# Patient Record
Sex: FEMALE | Race: BLACK OR AFRICAN AMERICAN | Employment: OTHER | URBAN - METROPOLITAN AREA
[De-identification: names, ages, dates, MRNs, and addresses within clinical notes are randomized per-mention and may not be internally consistent; named-entity substitution may affect disease eponyms.]

---

## 2017-05-24 ENCOUNTER — OFFICE VISIT (OUTPATIENT)
Dept: ORTHOPEDIC SURGERY | Age: 82
End: 2017-05-24

## 2017-05-24 VITALS — BODY MASS INDEX: 27.6 KG/M2 | RESPIRATION RATE: 16 BRPM | HEIGHT: 62 IN | WEIGHT: 150 LBS

## 2017-05-24 DIAGNOSIS — S72.144D CLOSED NONDISPLACED INTERTROCHANTERIC FRACTURE OF RIGHT FEMUR WITH ROUTINE HEALING: Primary | ICD-10-CM

## 2017-05-24 PROCEDURE — 4040F PNEUMOC VAC/ADMIN/RCVD: CPT | Performed by: ORTHOPAEDIC SURGERY

## 2017-05-24 PROCEDURE — 1090F PRES/ABSN URINE INCON ASSESS: CPT | Performed by: ORTHOPAEDIC SURGERY

## 2017-05-24 PROCEDURE — G8427 DOCREV CUR MEDS BY ELIG CLIN: HCPCS | Performed by: ORTHOPAEDIC SURGERY

## 2017-05-24 PROCEDURE — 99024 POSTOP FOLLOW-UP VISIT: CPT | Performed by: ORTHOPAEDIC SURGERY

## 2017-05-24 PROCEDURE — 1036F TOBACCO NON-USER: CPT | Performed by: ORTHOPAEDIC SURGERY

## 2017-05-24 PROCEDURE — 73502 X-RAY EXAM HIP UNI 2-3 VIEWS: CPT | Performed by: ORTHOPAEDIC SURGERY

## 2017-05-24 PROCEDURE — G8420 CALC BMI NORM PARAMETERS: HCPCS | Performed by: ORTHOPAEDIC SURGERY

## 2017-05-24 PROCEDURE — 1123F ACP DISCUSS/DSCN MKR DOCD: CPT | Performed by: ORTHOPAEDIC SURGERY

## 2017-05-24 RX ORDER — TRAMADOL HYDROCHLORIDE 50 MG/1
50 TABLET ORAL EVERY 6 HOURS PRN
Status: ON HOLD | COMMUNITY
End: 2020-12-24 | Stop reason: HOSPADM

## 2017-05-24 RX ORDER — NYSTATIN 100000 [USP'U]/G
POWDER TOPICAL 4 TIMES DAILY
Status: ON HOLD | COMMUNITY
End: 2020-12-23

## 2017-05-24 RX ORDER — IBUPROFEN 200 MG
200 TABLET ORAL EVERY 6 HOURS PRN
Status: ON HOLD | COMMUNITY
End: 2020-12-23

## 2017-07-26 ENCOUNTER — OFFICE VISIT (OUTPATIENT)
Dept: ORTHOPEDIC SURGERY | Age: 82
End: 2017-07-26

## 2017-07-26 VITALS — BODY MASS INDEX: 27.6 KG/M2 | WEIGHT: 150 LBS | RESPIRATION RATE: 16 BRPM | HEIGHT: 62 IN

## 2017-07-26 DIAGNOSIS — S72.144D CLOSED NONDISPLACED INTERTROCHANTERIC FRACTURE OF RIGHT FEMUR WITH ROUTINE HEALING: Primary | ICD-10-CM

## 2017-07-26 PROCEDURE — G8419 CALC BMI OUT NRM PARAM NOF/U: HCPCS | Performed by: ORTHOPAEDIC SURGERY

## 2017-07-26 PROCEDURE — 99213 OFFICE O/P EST LOW 20 MIN: CPT | Performed by: ORTHOPAEDIC SURGERY

## 2017-07-26 PROCEDURE — 4040F PNEUMOC VAC/ADMIN/RCVD: CPT | Performed by: ORTHOPAEDIC SURGERY

## 2017-07-26 PROCEDURE — 1090F PRES/ABSN URINE INCON ASSESS: CPT | Performed by: ORTHOPAEDIC SURGERY

## 2017-07-26 PROCEDURE — 1123F ACP DISCUSS/DSCN MKR DOCD: CPT | Performed by: ORTHOPAEDIC SURGERY

## 2017-07-26 PROCEDURE — G8428 CUR MEDS NOT DOCUMENT: HCPCS | Performed by: ORTHOPAEDIC SURGERY

## 2017-07-26 PROCEDURE — 1036F TOBACCO NON-USER: CPT | Performed by: ORTHOPAEDIC SURGERY

## 2017-09-14 ENCOUNTER — HOSPITAL ENCOUNTER (OUTPATIENT)
Dept: MAMMOGRAPHY | Age: 82
Discharge: HOME | End: 2017-09-14
Attending: INTERNAL MEDICINE | Admitting: INTERNAL MEDICINE

## 2017-09-14 DIAGNOSIS — D49.3 NEOPLASM OF BREAST: ICD-10-CM

## 2017-09-14 DIAGNOSIS — Z12.31 ENCOUNTER FOR SCREENING MAMMOGRAM FOR BREAST CANCER: ICD-10-CM

## 2018-10-17 ENCOUNTER — HOSPITAL ENCOUNTER (OUTPATIENT)
Dept: WOMENS IMAGING | Age: 83
Discharge: HOME | End: 2018-10-17
Payer: MEDICARE

## 2018-10-17 DIAGNOSIS — Z12.39 BREAST CANCER SCREENING: ICD-10-CM

## 2018-10-17 PROCEDURE — 77067 SCR MAMMO BI INCL CAD: CPT

## 2019-10-18 ENCOUNTER — HOSPITAL ENCOUNTER (OUTPATIENT)
Dept: WOMENS IMAGING | Age: 84
Discharge: HOME | End: 2019-10-18
Payer: MEDICARE

## 2019-10-18 DIAGNOSIS — Z12.31 ENCOUNTER FOR SCREENING MAMMOGRAM FOR BREAST CANCER: ICD-10-CM

## 2019-10-18 PROCEDURE — 77067 SCR MAMMO BI INCL CAD: CPT

## 2020-11-02 ENCOUNTER — HOSPITAL ENCOUNTER (OUTPATIENT)
Dept: WOMENS IMAGING | Age: 85
Discharge: HOME | End: 2020-11-02
Payer: MEDICARE

## 2020-11-02 PROCEDURE — 77067 SCR MAMMO BI INCL CAD: CPT

## 2020-12-22 ENCOUNTER — APPOINTMENT (OUTPATIENT)
Dept: GENERAL RADIOLOGY | Age: 85
DRG: 177 | End: 2020-12-22
Payer: MEDICARE

## 2020-12-22 ENCOUNTER — HOSPITAL ENCOUNTER (INPATIENT)
Age: 85
LOS: 2 days | Discharge: HOME HEALTH | DRG: 177 | End: 2020-12-25
Attending: INTERNAL MEDICINE | Admitting: INTERNAL MEDICINE
Payer: MEDICARE

## 2020-12-22 DIAGNOSIS — R41.82 ALTERED MENTAL STATUS, UNSPECIFIED ALTERED MENTAL STATUS TYPE: ICD-10-CM

## 2020-12-22 DIAGNOSIS — S32.050A COMPRESSION FRACTURE OF L5 VERTEBRA, INITIAL ENCOUNTER (HCC): ICD-10-CM

## 2020-12-22 DIAGNOSIS — S32.050D COMPRESSION FRACTURE OF L5 VERTEBRA WITH ROUTINE HEALING, SUBSEQUENT ENCOUNTER: ICD-10-CM

## 2020-12-22 DIAGNOSIS — J45.41 MODERATE PERSISTENT REACTIVE AIRWAY DISEASE WITH ACUTE EXACERBATION: ICD-10-CM

## 2020-12-22 DIAGNOSIS — N39.0 URINARY TRACT INFECTION IN FEMALE: Primary | ICD-10-CM

## 2020-12-22 PROCEDURE — 93005 ELECTROCARDIOGRAM TRACING: CPT | Performed by: NURSE PRACTITIONER

## 2020-12-22 PROCEDURE — 96374 THER/PROPH/DIAG INJ IV PUSH: CPT

## 2020-12-22 PROCEDURE — 99284 EMERGENCY DEPT VISIT MOD MDM: CPT

## 2020-12-22 PROCEDURE — 71045 X-RAY EXAM CHEST 1 VIEW: CPT

## 2020-12-22 ASSESSMENT — ENCOUNTER SYMPTOMS
SHORTNESS OF BREATH: 0
DIARRHEA: 0
NAUSEA: 0
VOMITING: 0
ABDOMINAL PAIN: 0
CHEST TIGHTNESS: 0

## 2020-12-23 ENCOUNTER — APPOINTMENT (OUTPATIENT)
Dept: CT IMAGING | Age: 85
DRG: 177 | End: 2020-12-23
Payer: MEDICARE

## 2020-12-23 PROBLEM — N39.0 UTI (URINARY TRACT INFECTION): Status: ACTIVE | Noted: 2020-12-23

## 2020-12-23 LAB
A/G RATIO: 0.7 (ref 1.1–2.2)
ALBUMIN SERPL-MCNC: 3.5 G/DL (ref 3.4–5)
ALP BLD-CCNC: 77 U/L (ref 40–129)
ALT SERPL-CCNC: 16 U/L (ref 10–40)
ANION GAP SERPL CALCULATED.3IONS-SCNC: 18 MMOL/L (ref 3–16)
AST SERPL-CCNC: 32 U/L (ref 15–37)
BACTERIA: ABNORMAL /HPF
BASOPHILS ABSOLUTE: 0 K/UL (ref 0–0.2)
BASOPHILS RELATIVE PERCENT: 0.4 %
BILIRUB SERPL-MCNC: 1.5 MG/DL (ref 0–1)
BILIRUBIN URINE: ABNORMAL
BLOOD, URINE: ABNORMAL
BUN BLDV-MCNC: 33 MG/DL (ref 7–20)
CALCIUM SERPL-MCNC: 10 MG/DL (ref 8.3–10.6)
CHLORIDE BLD-SCNC: 99 MMOL/L (ref 99–110)
CLARITY: ABNORMAL
CO2: 25 MMOL/L (ref 21–32)
COLOR: ABNORMAL
COMMENT UA: ABNORMAL
CREAT SERPL-MCNC: 0.8 MG/DL (ref 0.6–1.2)
D DIMER: 1468 NG/ML DDU (ref 0–229)
EKG ATRIAL RATE: 117 BPM
EKG DIAGNOSIS: NORMAL
EKG P AXIS: 9 DEGREES
EKG P-R INTERVAL: 166 MS
EKG Q-T INTERVAL: 326 MS
EKG QRS DURATION: 92 MS
EKG QTC CALCULATION (BAZETT): 454 MS
EKG R AXIS: -40 DEGREES
EKG T AXIS: 100 DEGREES
EKG VENTRICULAR RATE: 117 BPM
EOSINOPHILS ABSOLUTE: 0.1 K/UL (ref 0–0.6)
EOSINOPHILS RELATIVE PERCENT: 0.7 %
EPITHELIAL CELLS, UA: 3 /HPF (ref 0–5)
GFR AFRICAN AMERICAN: >60
GFR NON-AFRICAN AMERICAN: >60
GLOBULIN: 4.7 G/DL
GLUCOSE BLD-MCNC: 122 MG/DL (ref 70–99)
GLUCOSE URINE: NEGATIVE MG/DL
HCT VFR BLD CALC: 46.1 % (ref 36–48)
HEMOGLOBIN: 14.5 G/DL (ref 12–16)
INR BLD: 1.29 (ref 0.86–1.14)
KETONES, URINE: 15 MG/DL
LEUKOCYTE ESTERASE, URINE: ABNORMAL
LYMPHOCYTES ABSOLUTE: 2.3 K/UL (ref 1–5.1)
LYMPHOCYTES RELATIVE PERCENT: 26.5 %
MCH RBC QN AUTO: 26.5 PG (ref 26–34)
MCHC RBC AUTO-ENTMCNC: 31.5 G/DL (ref 31–36)
MCV RBC AUTO: 84.2 FL (ref 80–100)
MICROSCOPIC EXAMINATION: YES
MONOCYTES ABSOLUTE: 0.9 K/UL (ref 0–1.3)
MONOCYTES RELATIVE PERCENT: 10 %
NEUTROPHILS ABSOLUTE: 5.4 K/UL (ref 1.7–7.7)
NEUTROPHILS RELATIVE PERCENT: 62.4 %
NITRITE, URINE: NEGATIVE
PDW BLD-RTO: 14.4 % (ref 12.4–15.4)
PH UA: 5.5 (ref 5–8)
PLATELET # BLD: 292 K/UL (ref 135–450)
PMV BLD AUTO: 9.2 FL (ref 5–10.5)
POTASSIUM SERPL-SCNC: 4.4 MMOL/L (ref 3.5–5.1)
PRO-BNP: 555 PG/ML (ref 0–449)
PROCALCITONIN: 0.12 NG/ML (ref 0–0.15)
PROTEIN UA: 100 MG/DL
PROTHROMBIN TIME: 15 SEC (ref 10–13.2)
RBC # BLD: 5.48 M/UL (ref 4–5.2)
RBC UA: ABNORMAL /HPF (ref 0–4)
SODIUM BLD-SCNC: 142 MMOL/L (ref 136–145)
SPECIFIC GRAVITY UA: 1.02 (ref 1–1.03)
TOTAL PROTEIN: 8.2 G/DL (ref 6.4–8.2)
TROPONIN: 0.05 NG/ML
URINE REFLEX TO CULTURE: YES
URINE TYPE: ABNORMAL
UROBILINOGEN, URINE: 2 E.U./DL
WBC # BLD: 8.7 K/UL (ref 4–11)
WBC UA: >900 /HPF (ref 0–5)

## 2020-12-23 PROCEDURE — 94640 AIRWAY INHALATION TREATMENT: CPT

## 2020-12-23 PROCEDURE — 94761 N-INVAS EAR/PLS OXIMETRY MLT: CPT

## 2020-12-23 PROCEDURE — U0003 INFECTIOUS AGENT DETECTION BY NUCLEIC ACID (DNA OR RNA); SEVERE ACUTE RESPIRATORY SYNDROME CORONAVIRUS 2 (SARS-COV-2) (CORONAVIRUS DISEASE [COVID-19]), AMPLIFIED PROBE TECHNIQUE, MAKING USE OF HIGH THROUGHPUT TECHNOLOGIES AS DESCRIBED BY CMS-2020-01-R: HCPCS

## 2020-12-23 PROCEDURE — 6360000002 HC RX W HCPCS: Performed by: INTERNAL MEDICINE

## 2020-12-23 PROCEDURE — 84145 PROCALCITONIN (PCT): CPT

## 2020-12-23 PROCEDURE — 85610 PROTHROMBIN TIME: CPT

## 2020-12-23 PROCEDURE — 93010 ELECTROCARDIOGRAM REPORT: CPT | Performed by: INTERNAL MEDICINE

## 2020-12-23 PROCEDURE — U0002 COVID-19 LAB TEST NON-CDC: HCPCS

## 2020-12-23 PROCEDURE — 80053 COMPREHEN METABOLIC PANEL: CPT

## 2020-12-23 PROCEDURE — 83880 ASSAY OF NATRIURETIC PEPTIDE: CPT

## 2020-12-23 PROCEDURE — 81001 URINALYSIS AUTO W/SCOPE: CPT

## 2020-12-23 PROCEDURE — 87186 SC STD MICRODIL/AGAR DIL: CPT

## 2020-12-23 PROCEDURE — 1200000000 HC SEMI PRIVATE

## 2020-12-23 PROCEDURE — 87077 CULTURE AEROBIC IDENTIFY: CPT

## 2020-12-23 PROCEDURE — 85379 FIBRIN DEGRADATION QUANT: CPT

## 2020-12-23 PROCEDURE — 6370000000 HC RX 637 (ALT 250 FOR IP): Performed by: INTERNAL MEDICINE

## 2020-12-23 PROCEDURE — 87086 URINE CULTURE/COLONY COUNT: CPT

## 2020-12-23 PROCEDURE — 2700000000 HC OXYGEN THERAPY PER DAY

## 2020-12-23 PROCEDURE — 6360000002 HC RX W HCPCS: Performed by: NURSE PRACTITIONER

## 2020-12-23 PROCEDURE — 84484 ASSAY OF TROPONIN QUANT: CPT

## 2020-12-23 PROCEDURE — 85025 COMPLETE CBC W/AUTO DIFF WBC: CPT

## 2020-12-23 PROCEDURE — 70450 CT HEAD/BRAIN W/O DYE: CPT

## 2020-12-23 RX ORDER — SODIUM CHLORIDE 9 MG/ML
INJECTION, SOLUTION INTRAVENOUS CONTINUOUS
Status: DISCONTINUED | OUTPATIENT
Start: 2020-12-23 | End: 2020-12-23

## 2020-12-23 RX ORDER — FERROUS SULFATE 325(65) MG
325 TABLET ORAL
Status: DISCONTINUED | OUTPATIENT
Start: 2020-12-23 | End: 2020-12-25 | Stop reason: HOSPADM

## 2020-12-23 RX ORDER — ARFORMOTEROL TARTRATE 15 UG/2ML
15 SOLUTION RESPIRATORY (INHALATION) 2 TIMES DAILY
Status: DISCONTINUED | OUTPATIENT
Start: 2020-12-23 | End: 2020-12-25 | Stop reason: HOSPADM

## 2020-12-23 RX ORDER — M-VIT,TX,IRON,MINS/CALC/FOLIC 27MG-0.4MG
1 TABLET ORAL DAILY
Status: DISCONTINUED | OUTPATIENT
Start: 2020-12-23 | End: 2020-12-25 | Stop reason: HOSPADM

## 2020-12-23 RX ORDER — IPRATROPIUM BROMIDE AND ALBUTEROL SULFATE 2.5; .5 MG/3ML; MG/3ML
3 SOLUTION RESPIRATORY (INHALATION)
Status: DISCONTINUED | OUTPATIENT
Start: 2020-12-23 | End: 2020-12-23 | Stop reason: ALTCHOICE

## 2020-12-23 RX ORDER — AMLODIPINE BESYLATE 5 MG/1
5 TABLET ORAL NIGHTLY
Status: DISCONTINUED | OUTPATIENT
Start: 2020-12-23 | End: 2020-12-25 | Stop reason: HOSPADM

## 2020-12-23 RX ORDER — SENNA PLUS 8.6 MG/1
1 TABLET ORAL DAILY
Status: DISCONTINUED | OUTPATIENT
Start: 2020-12-23 | End: 2020-12-25 | Stop reason: HOSPADM

## 2020-12-23 RX ORDER — LABETALOL HYDROCHLORIDE 5 MG/ML
20 INJECTION, SOLUTION INTRAVENOUS EVERY 4 HOURS PRN
Status: DISCONTINUED | OUTPATIENT
Start: 2020-12-23 | End: 2020-12-25 | Stop reason: HOSPADM

## 2020-12-23 RX ORDER — CETIRIZINE HYDROCHLORIDE 10 MG/1
10 TABLET ORAL DAILY
Status: DISCONTINUED | OUTPATIENT
Start: 2020-12-23 | End: 2020-12-25 | Stop reason: HOSPADM

## 2020-12-23 RX ORDER — TRAMADOL HYDROCHLORIDE 50 MG/1
50 TABLET ORAL EVERY 6 HOURS PRN
Status: DISCONTINUED | OUTPATIENT
Start: 2020-12-23 | End: 2020-12-25 | Stop reason: HOSPADM

## 2020-12-23 RX ORDER — PREDNISONE 1 MG/1
5 TABLET ORAL DAILY
Status: DISCONTINUED | OUTPATIENT
Start: 2020-12-23 | End: 2020-12-25 | Stop reason: HOSPADM

## 2020-12-23 RX ORDER — GUAIFENESIN 100 MG/5ML
200 SOLUTION ORAL EVERY 6 HOURS PRN
Status: DISCONTINUED | OUTPATIENT
Start: 2020-12-23 | End: 2020-12-25 | Stop reason: HOSPADM

## 2020-12-23 RX ORDER — FUROSEMIDE 10 MG/ML
20 INJECTION INTRAMUSCULAR; INTRAVENOUS DAILY
Status: DISCONTINUED | OUTPATIENT
Start: 2020-12-23 | End: 2020-12-25 | Stop reason: HOSPADM

## 2020-12-23 RX ORDER — FUROSEMIDE 40 MG/1
40 TABLET ORAL DAILY
Status: DISCONTINUED | OUTPATIENT
Start: 2020-12-23 | End: 2020-12-23

## 2020-12-23 RX ORDER — DIAPER,BRIEF,INFANT-TODD,DISP
1 EACH MISCELLANEOUS 2 TIMES DAILY PRN
Status: DISCONTINUED | OUTPATIENT
Start: 2020-12-23 | End: 2020-12-23 | Stop reason: RX

## 2020-12-23 RX ORDER — HYDROCODONE BITARTRATE AND ACETAMINOPHEN 5; 325 MG/1; MG/1
1 TABLET ORAL EVERY 4 HOURS PRN
Status: DISCONTINUED | OUTPATIENT
Start: 2020-12-23 | End: 2020-12-25 | Stop reason: HOSPADM

## 2020-12-23 RX ORDER — CHOLECALCIFEROL (VITAMIN D3) 1250 MCG
1 CAPSULE ORAL
Status: DISCONTINUED | OUTPATIENT
Start: 2020-12-23 | End: 2020-12-23 | Stop reason: RX

## 2020-12-23 RX ORDER — IBUPROFEN 400 MG/1
200 TABLET ORAL EVERY 6 HOURS PRN
Status: DISCONTINUED | OUTPATIENT
Start: 2020-12-23 | End: 2020-12-23

## 2020-12-23 RX ORDER — POLYETHYLENE GLYCOL 3350 17 G/17G
17 POWDER, FOR SOLUTION ORAL DAILY PRN
Status: DISCONTINUED | OUTPATIENT
Start: 2020-12-23 | End: 2020-12-25 | Stop reason: HOSPADM

## 2020-12-23 RX ORDER — ACETAMINOPHEN 325 MG/1
650 TABLET ORAL EVERY 4 HOURS PRN
Status: DISCONTINUED | OUTPATIENT
Start: 2020-12-23 | End: 2020-12-25 | Stop reason: HOSPADM

## 2020-12-23 RX ORDER — DIAPER,BRIEF,INFANT-TODD,DISP
1 EACH MISCELLANEOUS NIGHTLY
Status: DISCONTINUED | OUTPATIENT
Start: 2020-12-23 | End: 2020-12-23 | Stop reason: RX

## 2020-12-23 RX ORDER — MONTELUKAST SODIUM 10 MG/1
10 TABLET ORAL NIGHTLY
Status: DISCONTINUED | OUTPATIENT
Start: 2020-12-23 | End: 2020-12-25 | Stop reason: HOSPADM

## 2020-12-23 RX ORDER — PROMETHAZINE HYDROCHLORIDE AND CODEINE PHOSPHATE 6.25; 1 MG/5ML; MG/5ML
5 SYRUP ORAL DAILY PRN
Status: DISCONTINUED | OUTPATIENT
Start: 2020-12-23 | End: 2020-12-25 | Stop reason: HOSPADM

## 2020-12-23 RX ORDER — ALBUTEROL SULFATE 90 UG/1
2 AEROSOL, METERED RESPIRATORY (INHALATION)
Status: DISCONTINUED | OUTPATIENT
Start: 2020-12-23 | End: 2020-12-25 | Stop reason: HOSPADM

## 2020-12-23 RX ORDER — ESCITALOPRAM OXALATE 10 MG/1
10 TABLET ORAL DAILY
Status: DISCONTINUED | OUTPATIENT
Start: 2020-12-23 | End: 2020-12-25 | Stop reason: HOSPADM

## 2020-12-23 RX ORDER — BISACODYL 10 MG
10 SUPPOSITORY, RECTAL RECTAL DAILY PRN
Status: DISCONTINUED | OUTPATIENT
Start: 2020-12-23 | End: 2020-12-25 | Stop reason: HOSPADM

## 2020-12-23 RX ORDER — LANOLIN ALCOHOL/MO/W.PET/CERES
3 CREAM (GRAM) TOPICAL NIGHTLY
Status: DISCONTINUED | OUTPATIENT
Start: 2020-12-23 | End: 2020-12-25 | Stop reason: HOSPADM

## 2020-12-23 RX ORDER — PANTOPRAZOLE SODIUM 40 MG/1
40 TABLET, DELAYED RELEASE ORAL DAILY
Status: DISCONTINUED | OUTPATIENT
Start: 2020-12-23 | End: 2020-12-25 | Stop reason: HOSPADM

## 2020-12-23 RX ADMIN — ALBUTEROL SULFATE 2 PUFF: 90 AEROSOL, METERED RESPIRATORY (INHALATION) at 17:12

## 2020-12-23 RX ADMIN — FUROSEMIDE 20 MG: 10 INJECTION, SOLUTION INTRAMUSCULAR; INTRAVENOUS at 17:58

## 2020-12-23 RX ADMIN — ENOXAPARIN SODIUM 30 MG: 30 INJECTION SUBCUTANEOUS at 14:07

## 2020-12-23 RX ADMIN — Medication 1 G: at 03:00

## 2020-12-23 RX ADMIN — ALBUTEROL SULFATE 2 PUFF: 90 AEROSOL, METERED RESPIRATORY (INHALATION) at 13:11

## 2020-12-23 RX ADMIN — ENOXAPARIN SODIUM 30 MG: 30 INJECTION SUBCUTANEOUS at 21:10

## 2020-12-23 RX ADMIN — IPRATROPIUM BROMIDE 2 PUFF: 17 AEROSOL, METERED RESPIRATORY (INHALATION) at 13:11

## 2020-12-23 RX ADMIN — IPRATROPIUM BROMIDE 2 PUFF: 17 AEROSOL, METERED RESPIRATORY (INHALATION) at 17:12
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
## 2020-12-23 NOTE — CARE COORDINATION
Discharge Planning Assessment    RN/SW discharge planner met with patient/ (and family member) to discuss reason for admission, current living situation, and potential needs at the time of discharge    Demographics/Insurance verified:  Yes    Current type of dwelling:  LTC at [facility]    Patient from ECF/SW confirmed with:  Terrie Garcia in admissions    Living arrangements:  LTC    Tentative discharge plan:  Back to LTC facility at [facility]. Terrie Garcia confirmed pt is DNR-CC and son Adelita Nguyen is primary contact. Transportation at the time of discharge:  Pt will need transportation back to facility.     Electronically signed by SEYMOUR Peck, GILLESW on 12/23/2020 at 3:00 PM

## 2020-12-24 ENCOUNTER — APPOINTMENT (OUTPATIENT)
Dept: GENERAL RADIOLOGY | Age: 85
DRG: 177 | End: 2020-12-24
Payer: MEDICARE

## 2020-12-24 PROBLEM — E44.0 MODERATE PROTEIN-CALORIE MALNUTRITION (HCC): Status: ACTIVE | Noted: 2020-12-24

## 2020-12-24 PROBLEM — E86.0 DEHYDRATION: Status: ACTIVE | Noted: 2020-12-24

## 2020-12-24 PROBLEM — R62.7 FAILURE TO THRIVE IN ADULT: Status: ACTIVE | Noted: 2020-12-24

## 2020-12-24 PROBLEM — U07.1 COVID-19 VIRUS INFECTION: Status: ACTIVE | Noted: 2020-12-24

## 2020-12-24 PROBLEM — G93.40 ACUTE ENCEPHALOPATHY: Status: ACTIVE | Noted: 2020-12-24

## 2020-12-24 LAB
ANION GAP SERPL CALCULATED.3IONS-SCNC: 16 MMOL/L (ref 3–16)
BUN BLDV-MCNC: 27 MG/DL (ref 7–20)
CALCIUM SERPL-MCNC: 10 MG/DL (ref 8.3–10.6)
CHLORIDE BLD-SCNC: 103 MMOL/L (ref 99–110)
CO2: 28 MMOL/L (ref 21–32)
CREAT SERPL-MCNC: 0.6 MG/DL (ref 0.6–1.2)
GFR AFRICAN AMERICAN: >60
GFR NON-AFRICAN AMERICAN: >60
GLUCOSE BLD-MCNC: 88 MG/DL (ref 70–99)
HCT VFR BLD CALC: 44.9 % (ref 36–48)
HEMOGLOBIN: 14.1 G/DL (ref 12–16)
MCH RBC QN AUTO: 26.3 PG (ref 26–34)
MCHC RBC AUTO-ENTMCNC: 31.3 G/DL (ref 31–36)
MCV RBC AUTO: 83.9 FL (ref 80–100)
PDW BLD-RTO: 14.1 % (ref 12.4–15.4)
PLATELET # BLD: 347 K/UL (ref 135–450)
PMV BLD AUTO: 9.1 FL (ref 5–10.5)
POTASSIUM SERPL-SCNC: 3.1 MMOL/L (ref 3.5–5.1)
RBC # BLD: 5.36 M/UL (ref 4–5.2)
SARS-COV-2, NAAT: DETECTED
SODIUM BLD-SCNC: 147 MMOL/L (ref 136–145)
WBC # BLD: 8 K/UL (ref 4–11)

## 2020-12-24 PROCEDURE — 6360000002 HC RX W HCPCS: Performed by: INTERNAL MEDICINE

## 2020-12-24 PROCEDURE — 51798 US URINE CAPACITY MEASURE: CPT

## 2020-12-24 PROCEDURE — 36415 COLL VENOUS BLD VENIPUNCTURE: CPT

## 2020-12-24 PROCEDURE — 85027 COMPLETE CBC AUTOMATED: CPT

## 2020-12-24 PROCEDURE — 80048 BASIC METABOLIC PNL TOTAL CA: CPT

## 2020-12-24 PROCEDURE — 1200000000 HC SEMI PRIVATE

## 2020-12-24 PROCEDURE — 94640 AIRWAY INHALATION TREATMENT: CPT

## 2020-12-24 PROCEDURE — 94761 N-INVAS EAR/PLS OXIMETRY MLT: CPT

## 2020-12-24 PROCEDURE — 71045 X-RAY EXAM CHEST 1 VIEW: CPT

## 2020-12-24 RX ORDER — HYDROCODONE BITARTRATE AND ACETAMINOPHEN 5; 325 MG/1; MG/1
1 TABLET ORAL EVERY 6 HOURS PRN
Qty: 20 TABLET | Refills: 0 | Status: SHIPPED | OUTPATIENT
Start: 2020-12-24 | End: 2020-12-29

## 2020-12-24 RX ORDER — AMLODIPINE BESYLATE 5 MG/1
10 TABLET ORAL DAILY
Qty: 30 TABLET | Refills: 1 | Status: SHIPPED | OUTPATIENT
Start: 2020-12-24

## 2020-12-24 RX ORDER — ALBUTEROL SULFATE 90 UG/1
2 AEROSOL, METERED RESPIRATORY (INHALATION)
Qty: 1 INHALER | Refills: 3 | Status: SHIPPED | OUTPATIENT
Start: 2020-12-24

## 2020-12-24 RX ORDER — ARFORMOTEROL TARTRATE 15 UG/2ML
15 SOLUTION RESPIRATORY (INHALATION) 2 TIMES DAILY
Qty: 120 ML | Refills: 3 | Status: SHIPPED | OUTPATIENT
Start: 2020-12-24

## 2020-12-24 RX ORDER — CEFUROXIME AXETIL 250 MG/1
250 TABLET ORAL 2 TIMES DAILY
Qty: 10 TABLET | Refills: 0 | Status: SHIPPED | OUTPATIENT
Start: 2020-12-24 | End: 2020-12-29

## 2020-12-24 RX ORDER — POTASSIUM CHLORIDE 7.45 MG/ML
10 INJECTION INTRAVENOUS PRN
Status: DISCONTINUED | OUTPATIENT
Start: 2020-12-24 | End: 2020-12-25 | Stop reason: HOSPADM

## 2020-12-24 RX ADMIN — ALBUTEROL SULFATE 2 PUFF: 90 AEROSOL, METERED RESPIRATORY (INHALATION) at 12:30

## 2020-12-24 RX ADMIN — FUROSEMIDE 20 MG: 10 INJECTION, SOLUTION INTRAMUSCULAR; INTRAVENOUS at 09:59

## 2020-12-24 RX ADMIN — ENOXAPARIN SODIUM 30 MG: 30 INJECTION SUBCUTANEOUS at 20:58

## 2020-12-24 RX ADMIN — IPRATROPIUM BROMIDE 2 PUFF: 17 AEROSOL, METERED RESPIRATORY (INHALATION) at 12:30

## 2020-12-24 RX ADMIN — ARFORMOTEROL TARTRATE 15 MCG: 15 SOLUTION RESPIRATORY (INHALATION) at 12:30

## 2020-12-24 RX ADMIN — Medication 1 G: at 02:35

## 2020-12-24 RX ADMIN — ENOXAPARIN SODIUM 30 MG: 30 INJECTION SUBCUTANEOUS at 09:50

## 2020-12-24 ASSESSMENT — PAIN SCALES - GENERAL: PAINLEVEL_OUTOF10: 0

## 2020-12-24 NOTE — PROGRESS NOTES
[facility]                                            Advanced Care Planning Note. Purpose of Encounter: Advanced care planning in light of  Advanced malnutrition dementia and failure to thrive   Parties In Attendance: Patient,  Miles lopez  [facility] [address]: No   Subjective: Patient/family understand that this conversation is to address long term care goal  Objective: No CPR , No artificial life support  Or life prolonging measures like PEG , Hemodialysis  She is DNR CC , Hospice is agreeable   Goals of Care Determination: Patient/POA   Code Status: DNR CC  Time spent on Advanced care Plannin minutes   Advanced Care Planning Documents: Completed advanced directives on chart, [facility] the son  is the POA.     Abby Gomez MD  2020 9:07 AM

## 2020-12-24 NOTE — CARE COORDINATION
CM talked to the patient's son- Tavon Brothers  regarding d/c plan of care, stated that was informed that  [facility] has contract with [employer]. Prefer to go with the agency. Referral was called to [address] will call the son and call back.     CM called [facility] to inform them pt is positive for Covid-19, left VM with call back number

## 2020-12-24 NOTE — CARE COORDINATION
Referral sent to [facility] hospice by the Palliative care Nurse. Will continue to follow .

## 2020-12-24 NOTE — CARE COORDINATION
[address] will meet with pt's son at [address] [place] [address] -12/25/20 at his home  to sign papers.

## 2020-12-24 NOTE — CARE COORDINATION
CM called [facility] to check whether they  will accept her back on d/c,  left VM  & call back  Number .

## 2020-12-24 NOTE — H&P
[facility]                     [facility], [address]                              HISTORY AND PHYSICAL    PATIENT NAME: Milady Smith                   :        1927  MED REC NO:   [id number]                          ROOM:       0  ACCOUNT NO:   [de-identified]                           ADMIT DATE: 2020  PROVIDER:     Samira Howard MD    HISTORY OF PRESENT ILLNESS:  The patient is a 70-year-old black American  lady came to the emergency room with history of altered mental status. The patient has steadily declining p.o. intake. She is a resident at  [facility]. Her blood sugar was also within normal range. The  patient was brought in by the squad. The patient was acting  differently. The patient's blood sugar did not show any fluctuation. There was no nausea, vomiting or diarrhea, no fever, no chills on  further evaluation. The patient has a steady decline of p.o. intake. PAST MEDICAL HISTORY:  Pertinent for senile dementia, malnutrition,  bronchial asthma, chronic low back pain, acquired hypothyroidism,  compression fracture, benign essential hypertension, insomnia,  heterotopic ossification, congestive heart failure, hypertension,  hypothyroidism, gastroesophageal reflux disease, diabetes mellitus,  osteoporosis, anorexia. PAST SURGICAL HISTORY:  Pertinent for total thyroidectomy, sinusotomy,  hip fracture surgery. FAMILY HISTORY:  Both her parents are  because of natural  causes. There is no major medical illness running in the family. SOCIAL HISTORY:  She is a . She has two grown children. She was  always a nonsmoker, always a nondrinker. 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 There is no history of  substance abuse. She is institutionalized for alf care. REVIEW OF SYSTEMS:  Negative for loss of consciousness. No convulsions. Appetite is poor. Overall nutrition on decline. No oropharyngeal  dysphagia. The patient has extremely poor appetite. The patient shows  no inclination to eat or drink anything. No abdominal pain. No  hematemesis. No melena. The patient is incontinent to urine and feces. The patient is nonambulatory. There is no history of convulsions. No  recent unusual pain. PHYSICAL EXAMINATION:  GENERAL:  Lethargic, incoherent 80year-old somnolent black American  lady with a body mass index of 22. VITAL SIGNS:  Her temperature is 97.2, blood pressure 145/72,  respirations 18, heart rate 108. HEENT:  Oral mucosa dry. SKIN:  Warm and dry. NECK:  Neck is supple. Faint carotid bruit. No jugular venous  distention. No lymphadenopathy. LUNGS:  Vesicular breath sounds. Poor inspiration. No crackles or  wheezing. HEART:  Regular rate and rhythm. S1, S2.  2/6 holosystolic murmur. No  gallop rhythm. ABDOMEN:  Soft, scaphoid. Bowel sounds present. EXTREMITIES:  Show no edema, but distal pulsations are very weak. NEUROLOGIC:  The patient has severe generalized neuromuscular weakness  with total lack of coordination. The patient in fact did not  participate in any sensorimotor evaluation. LABORATORY EVALUATION:  Shows sodium 142, potassium 4.4, chloride 99,  CO2 25, BUN 33, creatinine 0.8. ProBNP level 555. Albumin 3.5,  globulin 4.7, alkaline phosphatase 77, AST and ALT are 32 and 16  respectively. Blood sugar is 122. White blood cell count is 8.7,  hemoglobin/hematocrit is 14.5 and 46.1, platelet count is 316. PT/INR  is 15 and 1.29.  D-dimer is 1468. Urine culture has been obtained, but  not back yet. COVID-19 test has been obtained, but not back yet. Urinalysis shows dark yellow urine with turbidity with 2.0 protein,  large amount of leukocyte esterase, more than 900 wbc's, negative  nitrites. The patient has very obviously a very florid urinary tract  infection.

## 2020-12-25 VITALS
HEART RATE: 109 BPM | OXYGEN SATURATION: 96 % | BODY MASS INDEX: 22.52 KG/M2 | TEMPERATURE: 98 F | DIASTOLIC BLOOD PRESSURE: 85 MMHG | HEIGHT: 62 IN | SYSTOLIC BLOOD PRESSURE: 149 MMHG | WEIGHT: 122.36 LBS | RESPIRATION RATE: 18 BRPM

## 2020-12-25 LAB
ANION GAP SERPL CALCULATED.3IONS-SCNC: 19 MMOL/L (ref 3–16)
BUN BLDV-MCNC: 27 MG/DL (ref 7–20)
CALCIUM SERPL-MCNC: 10.2 MG/DL (ref 8.3–10.6)
CHLORIDE BLD-SCNC: 106 MMOL/L (ref 99–110)
CO2: 25 MMOL/L (ref 21–32)
CREAT SERPL-MCNC: 0.6 MG/DL (ref 0.6–1.2)
GFR AFRICAN AMERICAN: >60
GFR NON-AFRICAN AMERICAN: >60
GLUCOSE BLD-MCNC: 87 MG/DL (ref 70–99)
ORGANISM: ABNORMAL
POTASSIUM SERPL-SCNC: 3.8 MMOL/L (ref 3.5–5.1)
SODIUM BLD-SCNC: 150 MMOL/L (ref 136–145)
URINE CULTURE, ROUTINE: ABNORMAL

## 2020-12-25 PROCEDURE — 80048 BASIC METABOLIC PNL TOTAL CA: CPT

## 2020-12-25 PROCEDURE — 36415 COLL VENOUS BLD VENIPUNCTURE: CPT

## 2020-12-25 PROCEDURE — 6360000002 HC RX W HCPCS: Performed by: INTERNAL MEDICINE

## 2020-12-25 RX ORDER — LORAZEPAM 2 MG/ML
1 CONCENTRATE ORAL EVERY 8 HOURS PRN
Qty: 30 ML | Refills: 0 | Status: SHIPPED | OUTPATIENT
Start: 2020-12-25 | End: 2021-01-08

## 2020-12-25 RX ORDER — MORPHINE SULFATE 100 MG/5ML
10 SOLUTION ORAL
Qty: 30 ML | Refills: 0 | Status: SHIPPED | OUTPATIENT
Start: 2020-12-25 | End: 2020-12-28

## 2020-12-25 RX ADMIN — Medication 1 G: at 02:43

## 2020-12-25 RX ADMIN — FUROSEMIDE 20 MG: 10 INJECTION, SOLUTION INTRAMUSCULAR; INTRAVENOUS at 08:44

## 2020-12-25 RX ADMIN — ENOXAPARIN SODIUM 30 MG: 30 INJECTION SUBCUTANEOUS at 08:44

## 2020-12-25 ASSESSMENT — PAIN SCALES - PAIN ASSESSMENT IN ADVANCED DEMENTIA (PAINAD)
FACIALEXPRESSION: 0
BODYLANGUAGE: 0
BODYLANGUAGE: 0
NEGVOCALIZATION: 0
BREATHING: 0
CONSOLABILITY: 0

## 2020-12-25 NOTE — PROGRESS NOTES
Pt is d/c to [facility] with [facility]. [address] transport here to take the pt. Iv taken out. Paperwork sent with transport.

## 2020-12-25 NOTE — PROGRESS NOTES
Department of Internal Medicine  General Internal Medicine   Progress Note      SUBJECTIVE: on a steady decline functionally , very poor po intake does not co-operate to take meds     History obtained from chart review and the patient  General ROS: positive for  - fatigue and malaise  negative for - chills, fever or night sweats  Psychological ROS: negative  Respiratory ROS: no cough, shortness of breath, or wheezing  Cardiovascular ROS: no chest pain or dyspnea on exertion  Gastrointestinal ROS: no abdominal pain, change in bowel habits, or black or bloody stools    OBJECTIVE      Medications      Current Facility-Administered Medications: potassium chloride 10 mEq/100 mL IVPB (Peripheral Line), 10 mEq, Intravenous, PRN  cefTRIAXone (ROCEPHIN) 1 g in sterile water 10 mL IV syringe, 1 g, Intravenous, Q24H  therapeutic multivitamin-minerals 1 tablet, 1 tablet, Oral, Daily  amLODIPine (NORVASC) tablet 5 mg, 5 mg, Oral, Nightly  bisacodyl (DULCOLAX) suppository 10 mg, 10 mg, Rectal, Daily PRN  ferrous sulfate (IRON 325) tablet 325 mg, 325 mg, Oral, Daily with breakfast  escitalopram (LEXAPRO) tablet 10 mg, 10 mg, Oral, Daily  senna (SENOKOT) tablet 8.6 mg, 1 tablet, Oral, Daily  acetaminophen (TYLENOL) tablet 650 mg, 650 mg, Oral, Q4H PRN  budesonide (PULMICORT) 180 MCG/ACT inhaler 120 puff, 120 puff, Inhalation, BID PRN  magnesium hydroxide (MILK OF MAGNESIA) 400 MG/5ML suspension 30 mL, 30 mL, Oral, Daily PRN  polyethylene glycol (GLYCOLAX) packet 17 g, 17 g, Oral, Daily PRN  benzocaine-menthol (CEPACOL SORE THROAT) lozenge 1 lozenge, 1 lozenge, Oral, Q2H PRN  melatonin tablet 3 mg, 3 mg, Oral, Nightly  cetirizine (ZYRTEC) tablet 10 mg, 10 mg, Oral, Daily  montelukast (SINGULAIR) tablet 10 mg, 10 mg, Oral, Nightly  dextromethorphan-guaiFENesin (MUCINEX DM)  MG per extended release tablet 2 tablet, 2 tablet, Oral, Q12H PRN  pantoprazole (PROTONIX) tablet 40 mg, 40 mg, Oral, Daily  predniSONE (DELTASONE) tablet 5 mg, 5 mg, Oral, Daily  guaiFENesin (ROBITUSSIN) 100 MG/5ML oral solution 200 mg, 200 mg, Oral, Q6H PRN  promethazine-codeine (PHENERGAN with CODEINE) 6.25-10 MG/5ML syrup 5 mL, 5 mL, Oral, Daily PRN  HYDROcodone-acetaminophen (NORCO) 5-325 MG per tablet 1 tablet, 1 tablet, Oral, Q4H PRN  traMADol (ULTRAM) tablet 50 mg, 50 mg, Oral, Q6H PRN  albuterol sulfate  (90 Base) MCG/ACT inhaler 2 puff, 2 puff, Inhalation, Q4H WA  ipratropium (ATROVENT HFA) 17 MCG/ACT inhaler 2 puff, 2 puff, Inhalation, Q4H WA  Arformoterol Tartrate (BROVANA) nebulizer solution 15 mcg, 15 mcg, Nebulization, BID  enoxaparin (LOVENOX) injection 30 mg, 30 mg, Subcutaneous, BID  furosemide (LASIX) injection 20 mg, 20 mg, Intravenous, Daily  labetalol (NORMODYNE;TRANDATE) injection 20 mg, 20 mg, Intravenous, Q4H PRN    Physical      VITALS:  BP (!) 149/85   Pulse 109   Temp 98 °F (36.7 °C) (Axillary)   Resp 18   Ht 5' 2\" (1.575 m)   Wt 122 lb 5.7 oz (55.5 kg)   SpO2 96%   BMI 22.38 kg/m²   TEMPERATURE:  Current - Temp: 98 °F (36.7 °C);  Max - Temp  Av.9 °F (36.6 °C)  Min: 97.7 °F (36.5 °C)  Max: 98.1 °F (36.7 °C)  RESPIRATIONS RANGE: Resp  Av.7  Min: 18  Max: 24  PULSE RANGE: Pulse  Av.3  Min: 106  Max: 118  BLOOD PRESSURE RANGE:  Systolic (13HDS), WHC:810 , Min:129 , TNP:316   ; Diastolic (05PGC), XHK:72, Min:65, Max:85    PULSE OXIMETRY RANGE: SpO2  Av.4 %  Min: 87 %  Max: 96 %  24HR INTAKE/OUTPUT:      Intake/Output Summary (Last 24 hours) at 2020 1157  Last data filed at 2020  Gross per 24 hour   Intake --   Output 800 ml   Net -800 ml     CONSTITUTIONAL:  awake, alert, cooperative, no apparent distress, and appears stated age  NECK:  supple, symmetrical, trachea midline, skin normal and no stridor  LUNGS:  No increased work of breathing, good air exchange, clear to auscultation bilaterally, no crackles or wheezing  CARDIOVASCULAR:  normal apical pulses, regular rate and rhythm and normal S1 and S2  ABDOMEN:  Soft BS + , nontender  NEUROLOGIC:  No acute focal neurologic deficit   SKIN:  Warm and dry  and no bruising or bleeding    Data      No results found for: PHART    Lab Results   Component Value Date     12/25/2020    K 3.8 12/25/2020     12/25/2020    CO2 25 12/25/2020    BUN 27 12/25/2020    CREATININE 0.6 12/25/2020    GLUCOSE 87 12/25/2020    CALCIUM 10.2 12/25/2020     Lab Results   Component Value Date    WBC 8.0 12/24/2020    HGB 14.1 12/24/2020    HCT 44.9 12/24/2020    MCV 83.9 12/24/2020     12/24/2020         Lab Results   Component Value Date    INR 1.29 (H) 12/23/2020       ASSESSMENT AND PLAN      Patient Active Problem List   Diagnosis    Hypertension    Cognitive deficits    [facility] obstructive [facility] disease) [facility]    DM2 (diabetes mellitus, type 2) (HCC)    Compression fracture of L5 lumbar vertebra    Osteoporosis    Dementia (HCC)    Asthma    Hypothyroidism (acquired)    UTI (urinary tract infection)    Acute encephalopathy    Failure to thrive in adult    Dehydration    Moderate protein-calorie malnutrition ([facility]    COVID-19 virus infection     Ct present comfort care as mutually agreed upon with her son , patient is awaiting  Dismissal under hospice care

## 2020-12-25 NOTE — PROGRESS NOTES
Assessment and med pass complete. Held PO meds per day shift RN. Purewick catheter in placed. Repositioned onto side with pillow support. Bed alarm engaged, call light in reach. Plan to return son's call and give updates.

## 2020-12-25 NOTE — CARE COORDINATION
CM received call back from [email or web address] in admissions at [facility] and they can accept patient back at facility and she is aware of [address] [facility] seeing pt. CM received call back from [place] with [address] [facility] and she states that the nurse will be meeting with son at [facility] for him to sign consent and then the nurse will come to the hospital and she will arrange transport for pt back to facility. CM called pt's RN Karina Palomino and updated her. Patient discharged 2/25/2020 to [facility] with [facility].    All discharge needs met per case management    Jarod Fields RN, BSN  [phone]

## 2020-12-25 NOTE — CARE COORDINATION
CM called and left vm for [facility]UST in admissions at [facility] to see if they can accept patient back. Pt is Covid +. Per CM note 12/24 VM was left with Covid + information and [facility] is meeting with son today at his home at [facility] called [facility] and spoke to [employer] and she states it needs to come from admissions that pt can return. CM called [facility] [phone] and they will have on call nurse call CM.     Kate Chaves RN, BSN  [phone]

## 2020-12-26 ENCOUNTER — CARE COORDINATION (OUTPATIENT)
Dept: CASE MANAGEMENT | Age: 85
End: 2020-12-26

## 2020-12-30 NOTE — PROGRESS NOTES
Physician Progress Note      PATIENT:               Carisa Cazares  CSN #:                  [id number]  :                       3/11/1927  ADMIT DATE:       2020 10:43 PM  [address] [place] DATE:        2020 12:58 PM  RESPONDING  PROVIDER #:        Juanito Sahu MD          QUERY TEXT:    Patient admitted with AMS and found to have a UTI. Noted documentation of   Acute Encephalopathy in H&P and senile dementia. If possible, please document   in progress notes and discharge summary:      The medical record reflects the following:  Risk Factors: 81 y/o female with a hx of dementia admitted with a UTI  Clinical Indicators: H&P: \"The patient was acting differently; Lethargic,   incoherent 80year-old somnolent black American  lady with a body mass index of 22. \"  Blood sugar in normal range. \"UTI\"  Treatment: Rocephin, Safety checks, Bed alarm  Options provided:  -- Acute Metabolic encephalopathy due to UTI  -- Advanced dementia, encephalopathy ruled out  -- Other - I will add my own diagnosis  -- Disagree - Not applicable / Not valid  -- Disagree - Clinically unable to determine / Unknown  -- Refer to Clinical Documentation Reviewer    PROVIDER RESPONSE TEXT:    This patient has advanced dementia, encephalopathy ruled out.     Query created by: Sangeeta Sylvester on 2020 6:25 AM      Electronically signed by:  Juanito Sahu MD 2020 12:40 PM

## 2020-12-31 NOTE — PROGRESS NOTES
Physician Progress Note      PATIENT:               Lai Monday  CSN #:                  [id number]  :                       3/11/1927  ADMIT DATE:       2020 10:43 PM  [address] [place] DATE:        2020 12:58 PM  RESPONDING  PROVIDER #:        Sam Goodsno MD          QUERY TEXT:    Patient admitted with Encephalopathy, UTI, and Dehydration and noted to be   Positive for COVID-19 infection on . If possible, please document in   your discharge summary the present on admission of COVID-19:    The medical record reflects the following:  Risk Factors: 81 y/o with a Hx of malnutrition, bronchial asthma,   hypertension, insomnia, CHF, hypertension, diabetes mellitus  Clinical Indicators: Per H&P: \"Acute encephalopathy, urinary tract infection,   dehydration, failure to thrive, severe protein-calorie malnutrition, advanced   dementia; COVID-19 test has been obtained, but not back yet. \"  Treatment: 02 for comfort, Prednisone, Duonebs  Options provided:  -- Yes, COVID-19 infection was present at the time of the order to admit to   the hospital  -- No, COVID -19 was not present on admission and developed during the   inpatient stay  -- Other - I will add my own diagnosis  -- Disagree - Not applicable / Not valid  -- Disagree - Clinically unable to determine / Unknown  -- Refer to Clinical Documentation Reviewer    PROVIDER RESPONSE TEXT:    Yes, COVID -19 was present at the time of the order to admit to the hospital.    Query created by: Juan Ding on 2020 7:25 AM      Electronically signed by:  Sam Goodson MD 2020 4:53 PM

## 2021-01-22 PROBLEM — N39.0 UTI (URINARY TRACT INFECTION): Status: RESOLVED | Noted: 2020-12-23 | Resolved: 2021-01-22

## 2021-01-23 PROBLEM — E86.0 DEHYDRATION: Status: RESOLVED | Noted: 2020-12-24 | Resolved: 2021-01-23

## 2021-01-27 NOTE — PROGRESS NOTES
Patient seen , discharge dictated scripts given , arrangements made , SHOSHANA completed .  Discussed with nursing staff  And   If applicable ,  Discussed with  Patient's family , all questions answered and concerns addressed  When applicable

## 2021-01-28 NOTE — DISCHARGE SUMMARY
level 555. Albumin 3.5, globulin 4.7, alkaline phosphatase 77, AST and  ALT were 32 and 16 respectively. Blood sugar was 122. White blood cell  count was 8.7, hemoglobin/hematocrit was 14.5 and 46.1, platelet count  458. PT/INR was 15 and 1.29.  COVID-19 test was obtained, but was not  back. The patient obviously had a florid urinary tract infection,  treated with IV hydration, IV ceftriaxone, urine culture and  sensitivity. The patient was also subjected to palliative care  consultation. Cultures were checked and the patient was appropriately  treated with IV antibiotics and eventually p.o. antibiotics to be  continued as an outpatient as above. Patient's COVID test was positive. The patient was discharged in stable condition with PT, OT and speech  evaluation.         Luis Alfredo Kitchen MD    D: 01/27/2021 16:03:51       T: 01/27/2021 16:10:24     [email or web address]  Job#: [id number]     Doc#: [id number]    CC: